# Patient Record
Sex: FEMALE | Race: WHITE | NOT HISPANIC OR LATINO | Employment: FULL TIME | URBAN - METROPOLITAN AREA
[De-identification: names, ages, dates, MRNs, and addresses within clinical notes are randomized per-mention and may not be internally consistent; named-entity substitution may affect disease eponyms.]

---

## 2017-10-17 ENCOUNTER — APPOINTMENT (OUTPATIENT)
Dept: WOMENS IMAGING | Facility: HOSPITAL | Age: 42
End: 2017-10-17

## 2017-10-17 PROCEDURE — 77067 SCR MAMMO BI INCL CAD: CPT | Performed by: RADIOLOGY

## 2018-02-05 ENCOUNTER — OFFICE VISIT (OUTPATIENT)
Dept: INTERNAL MEDICINE | Facility: CLINIC | Age: 43
End: 2018-02-05

## 2018-02-05 VITALS
OXYGEN SATURATION: 99 % | BODY MASS INDEX: 36.29 KG/M2 | WEIGHT: 197.19 LBS | HEART RATE: 103 BPM | SYSTOLIC BLOOD PRESSURE: 116 MMHG | DIASTOLIC BLOOD PRESSURE: 72 MMHG | HEIGHT: 62 IN

## 2018-02-05 DIAGNOSIS — F41.1 GENERALIZED ANXIETY DISORDER: Primary | ICD-10-CM

## 2018-02-05 PROCEDURE — 99214 OFFICE O/P EST MOD 30 MIN: CPT | Performed by: NURSE PRACTITIONER

## 2018-02-05 RX ORDER — HYDROXYZINE PAMOATE 25 MG/1
25 CAPSULE ORAL 2 TIMES DAILY PRN
Qty: 30 CAPSULE | Refills: 1 | Status: SHIPPED | OUTPATIENT
Start: 2018-02-05 | End: 2018-03-20

## 2018-02-05 RX ORDER — ESCITALOPRAM OXALATE 10 MG/1
10 TABLET ORAL DAILY
Qty: 30 TABLET | Refills: 1 | Status: SHIPPED | OUTPATIENT
Start: 2018-02-05 | End: 2018-03-20 | Stop reason: SDUPTHER

## 2018-02-05 NOTE — PROGRESS NOTES
Subjective   Leann Gan is a 42 y.o. female. Patient is here today for   Chief Complaint   Patient presents with   • Anxiety     Pt here to discuss anxiety x2 months. Pt does report panic attacks. Pt has not taken any anxiety/depression medication in the past.    .    History of Present Illness   New patient here to establish care.  Health history and questionnaire have been reviewed in its entirety.   C/o some issues with anxiety for the past 2 months. She has had the following symptoms - overthinking, worrying, stomach in knots, irritable, tense muscles, can't think straight. 10 years ago her daughter's father was deported and she was stressed out him coming back for her, but that isn't anything that has happened recently  She has had trouble falling sleeping for about 7-8 years. She takes Benadryl which makes her drowsy the next day.   Denies being depressed.     The following portions of the patient's history were reviewed and updated as appropriate: allergies, current medications, past family history, past medical history, past social history, past surgical history and problem list.    Review of Systems   Constitutional: Negative.    Respiratory: Negative.    Cardiovascular: Negative.    Psychiatric/Behavioral: Positive for sleep disturbance. The patient is nervous/anxious.        Objective   Vitals:    02/05/18 1241   BP: 116/72   Pulse: 103   SpO2: 99%     Physical Exam   Constitutional: Vital signs are normal. She appears well-developed and well-nourished.   Cardiovascular: Normal rate, regular rhythm and normal heart sounds.    Pulmonary/Chest: Effort normal and breath sounds normal.   Skin: Skin is warm, dry and intact.   Psychiatric: Her speech is normal and behavior is normal. Judgment and thought content normal. Her mood appears anxious.   Nursing note and vitals reviewed.      Assessment/Plan   Leann was seen today for anxiety.    Diagnoses and all orders for this visit:    Generalized anxiety  disorder  -     escitalopram (LEXAPRO) 10 MG tablet; Take 1 tablet by mouth Daily.  -     hydrOXYzine (VISTARIL) 25 MG capsule; Take 1 capsule by mouth 2 (Two) Times a Day As Needed for Anxiety.    Patient will take lexapro in the evening to help with anxiety and to see if that will help with her sleeping also. F/u in one month to reassess

## 2018-02-20 ENCOUNTER — TELEPHONE (OUTPATIENT)
Dept: INTERNAL MEDICINE | Facility: CLINIC | Age: 43
End: 2018-02-20

## 2018-02-20 NOTE — TELEPHONE ENCOUNTER
----- Message from LIZETT Siddiqi sent at 2/20/2018  8:40 AM EST -----  Regarding: RE: MED CONCERN  Contact: [phone]  I had also started her on lexapro. The hydroxyzine was just to be used as needed. Stop hydroxyzine and continue with lexapro.     ----- Message -----     From: Cat Altamirano MA     Sent: 2/20/2018   8:15 AM       To: LIZETT Siddiqi  Subject: FW: MED CONCERN                                  Pt stated that her Hydroxyzine is making her have headaches, feel fatigue & feel like she in a a fog. Pt is requesting to try something else.     ----- Message -----     From: Emily Ho     Sent: 2/19/2018   1:52 PM       To: Cat Altamirano MA  Subject: MED CONCERN                                      Patient called concerning the Hydroxizine. She said it isn't working for her and she is having some side effects from it. She has a very low amount of energy. She has been taking it for 3 weeks and would like to discuss it. Thanks

## 2018-02-20 NOTE — TELEPHONE ENCOUNTER
Pt aware to stop hydroxyzine completely & continue the lexapro. Pt will follow up with us on 3/6/2018.

## 2018-03-20 ENCOUNTER — OFFICE VISIT (OUTPATIENT)
Dept: INTERNAL MEDICINE | Facility: CLINIC | Age: 43
End: 2018-03-20

## 2018-03-20 VITALS
BODY MASS INDEX: 35.7 KG/M2 | OXYGEN SATURATION: 98 % | TEMPERATURE: 98.9 F | SYSTOLIC BLOOD PRESSURE: 112 MMHG | HEIGHT: 62 IN | DIASTOLIC BLOOD PRESSURE: 72 MMHG | WEIGHT: 194 LBS | HEART RATE: 82 BPM

## 2018-03-20 DIAGNOSIS — F41.1 GENERALIZED ANXIETY DISORDER: Primary | ICD-10-CM

## 2018-03-20 DIAGNOSIS — Z00.00 HEALTHCARE MAINTENANCE: ICD-10-CM

## 2018-03-20 PROCEDURE — 99213 OFFICE O/P EST LOW 20 MIN: CPT | Performed by: NURSE PRACTITIONER

## 2018-03-20 RX ORDER — ESCITALOPRAM OXALATE 10 MG/1
10 TABLET ORAL DAILY
Qty: 30 TABLET | Refills: 3 | Status: SHIPPED | OUTPATIENT
Start: 2018-03-20 | End: 2018-08-16 | Stop reason: SDUPTHER

## 2018-03-20 NOTE — PROGRESS NOTES
Subjective   Leann Gan is a 43 y.o. female. Patient is here today for   Chief Complaint   Patient presents with   • Anxiety     Pt here to follow up on anxiety. Pt does think that the lexapro 10mg once corina is helping but she still sees some anxiety attacks that come on.    .    History of Present Illness   Patient is here to follow up on anxiety. She is feeling better overall. She does have some panic episodes about 2-3 times per week.   She has had some increased thirst, but otherwise denies any side effects. She stopped taking the hydroxyzine, because it made her too drowsy. She hasn't seen a therapist or counselor.   She is sleeping okay.     The following portions of the patient's history were reviewed and updated as appropriate: allergies, current medications, past family history, past medical history, past social history, past surgical history and problem list.    Review of Systems   Constitutional: Negative.    Respiratory: Negative.    Cardiovascular: Negative.    Gastrointestinal: Negative.    Psychiatric/Behavioral: Negative for sleep disturbance. The patient is nervous/anxious.        Objective   Vitals:    03/20/18 1339   BP: 112/72   Pulse: 82   Temp: 98.9 °F (37.2 °C)   SpO2: 98%     Physical Exam   Constitutional: Vital signs are normal. She appears well-developed and well-nourished.   Cardiovascular: Normal rate, regular rhythm and normal heart sounds.    Pulmonary/Chest: Effort normal and breath sounds normal.   Skin: Skin is warm, dry and intact.   Psychiatric: She has a normal mood and affect. Her speech is normal and behavior is normal. Judgment and thought content normal.   Nursing note and vitals reviewed.      Assessment/Plan   Leann was seen today for anxiety.    Diagnoses and all orders for this visit:    Generalized anxiety disorder  -     escitalopram (LEXAPRO) 10 MG tablet; Take 1 tablet by mouth Daily.    Healthcare maintenance  -     CBC & Differential  -     Comprehensive  Metabolic Panel  -     Lipid Panel With / Chol / HDL Ratio  -     TSH  -     Vitamin D 25 Hydroxy    Patient has not had fasting labs, so will return for those in the next couple of weeks.     She was given a list of counselors. If she continues with increased anxiety, she will call the office and I will increase her lexapro to 20 mg daily.

## 2018-08-16 DIAGNOSIS — F41.1 GENERALIZED ANXIETY DISORDER: ICD-10-CM

## 2018-08-16 RX ORDER — ESCITALOPRAM OXALATE 10 MG/1
TABLET ORAL
Qty: 30 TABLET | Refills: 0 | Status: SHIPPED | OUTPATIENT
Start: 2018-08-16 | End: 2018-09-14 | Stop reason: SDUPTHER

## 2018-09-14 DIAGNOSIS — F41.1 GENERALIZED ANXIETY DISORDER: ICD-10-CM

## 2018-09-14 RX ORDER — ESCITALOPRAM OXALATE 10 MG/1
TABLET ORAL
Qty: 30 TABLET | Refills: 0 | Status: SHIPPED | OUTPATIENT
Start: 2018-09-14 | End: 2018-10-01 | Stop reason: SDUPTHER

## 2018-10-01 ENCOUNTER — OFFICE VISIT (OUTPATIENT)
Dept: INTERNAL MEDICINE | Facility: CLINIC | Age: 43
End: 2018-10-01

## 2018-10-01 VITALS
DIASTOLIC BLOOD PRESSURE: 68 MMHG | OXYGEN SATURATION: 99 % | SYSTOLIC BLOOD PRESSURE: 102 MMHG | HEIGHT: 62 IN | WEIGHT: 160.5 LBS | HEART RATE: 79 BPM | BODY MASS INDEX: 29.53 KG/M2

## 2018-10-01 DIAGNOSIS — F41.1 GENERALIZED ANXIETY DISORDER: Primary | ICD-10-CM

## 2018-10-01 PROCEDURE — 99213 OFFICE O/P EST LOW 20 MIN: CPT | Performed by: NURSE PRACTITIONER

## 2018-10-01 RX ORDER — ESCITALOPRAM OXALATE 10 MG/1
10 TABLET ORAL DAILY
Qty: 30 TABLET | Refills: 11 | Status: SHIPPED | OUTPATIENT
Start: 2018-10-01 | End: 2019-10-20 | Stop reason: SDUPTHER

## 2018-10-01 NOTE — PROGRESS NOTES
Subjective   Leann Gan is a 43 y.o. female. Patient is here today for   Chief Complaint   Patient presents with   • Anxiety     Pt here to follow up on anxiety. Pt doing well on Lexapro 10mg once daily.    .    History of Present Illness   Patient is here to follow up on anxiety. She is doing well on lexapro once daily.   Denies any problems. She has lost weight by doing the Keto diet and intermittent fasting.     The following portions of the patient's history were reviewed and updated as appropriate: allergies, current medications, past family history, past medical history, past social history, past surgical history and problem list.    Review of Systems   Constitutional: Negative.    Respiratory: Negative.    Cardiovascular: Negative.    Psychiatric/Behavioral: Negative.        Objective   Vitals:    10/01/18 1315   BP: 102/68   Pulse: 79   SpO2: 99%     Physical Exam   Constitutional: Vital signs are normal. She appears well-developed and well-nourished.   Cardiovascular: Normal rate, regular rhythm and normal heart sounds.    Pulmonary/Chest: Effort normal and breath sounds normal.   Skin: Skin is warm, dry and intact.   Psychiatric: She has a normal mood and affect. Her speech is normal and behavior is normal. Thought content normal.   Nursing note and vitals reviewed.    Assessment/Plan   Leann was seen today for anxiety.    Diagnoses and all orders for this visit:    Generalized anxiety disorder  -     escitalopram (LEXAPRO) 10 MG tablet; Take 1 tablet by mouth Daily.    Patient will return next month for fasting labs.

## 2018-11-02 ENCOUNTER — APPOINTMENT (OUTPATIENT)
Dept: WOMENS IMAGING | Facility: HOSPITAL | Age: 43
End: 2018-11-02

## 2018-11-02 PROCEDURE — 77063 BREAST TOMOSYNTHESIS BI: CPT | Performed by: RADIOLOGY

## 2018-11-02 PROCEDURE — 77067 SCR MAMMO BI INCL CAD: CPT | Performed by: RADIOLOGY

## 2019-10-20 DIAGNOSIS — F41.1 GENERALIZED ANXIETY DISORDER: ICD-10-CM

## 2019-10-21 RX ORDER — ESCITALOPRAM OXALATE 10 MG/1
TABLET ORAL
Qty: 30 TABLET | Refills: 10 | Status: SHIPPED | OUTPATIENT
Start: 2019-10-21 | End: 2020-02-27 | Stop reason: SDUPTHER

## 2020-01-07 ENCOUNTER — APPOINTMENT (OUTPATIENT)
Dept: WOMENS IMAGING | Facility: HOSPITAL | Age: 45
End: 2020-01-07

## 2020-01-07 PROCEDURE — 77063 BREAST TOMOSYNTHESIS BI: CPT | Performed by: RADIOLOGY

## 2020-01-07 PROCEDURE — 77067 SCR MAMMO BI INCL CAD: CPT | Performed by: RADIOLOGY

## 2020-02-21 DIAGNOSIS — Z00.00 HEALTHCARE MAINTENANCE: Primary | ICD-10-CM

## 2020-02-25 LAB
25(OH)D3+25(OH)D2 SERPL-MCNC: 34 NG/ML (ref 30–100)
ALBUMIN SERPL-MCNC: 4.1 G/DL (ref 3.5–5.2)
ALBUMIN/GLOB SERPL: 1.6 G/DL
ALP SERPL-CCNC: 59 U/L (ref 39–117)
ALT SERPL-CCNC: 18 U/L (ref 1–33)
AST SERPL-CCNC: 23 U/L (ref 1–32)
BASOPHILS # BLD AUTO: 0.03 10*3/MM3 (ref 0–0.2)
BASOPHILS NFR BLD AUTO: 0.6 % (ref 0–1.5)
BILIRUB SERPL-MCNC: 0.3 MG/DL (ref 0.2–1.2)
BUN SERPL-MCNC: 11 MG/DL (ref 6–20)
BUN/CREAT SERPL: 14.7 (ref 7–25)
CALCIUM SERPL-MCNC: 8.6 MG/DL (ref 8.6–10.5)
CHLORIDE SERPL-SCNC: 103 MMOL/L (ref 98–107)
CHOLEST SERPL-MCNC: 177 MG/DL (ref 0–200)
CHOLEST/HDLC SERPL: 3.22 {RATIO}
CO2 SERPL-SCNC: 22.6 MMOL/L (ref 22–29)
CREAT SERPL-MCNC: 0.75 MG/DL (ref 0.57–1)
EOSINOPHIL # BLD AUTO: 0.17 10*3/MM3 (ref 0–0.4)
EOSINOPHIL NFR BLD AUTO: 3.3 % (ref 0.3–6.2)
ERYTHROCYTE [DISTWIDTH] IN BLOOD BY AUTOMATED COUNT: 16.4 % (ref 12.3–15.4)
FERRITIN SERPL-MCNC: 7.19 NG/ML (ref 13–150)
GLOBULIN SER CALC-MCNC: 2.6 GM/DL
GLUCOSE SERPL-MCNC: 83 MG/DL (ref 65–99)
HCT VFR BLD AUTO: 33.1 % (ref 34–46.6)
HDLC SERPL-MCNC: 55 MG/DL (ref 40–60)
HGB BLD-MCNC: 10.4 G/DL (ref 12–15.9)
IMM GRANULOCYTES # BLD AUTO: 0 10*3/MM3 (ref 0–0.05)
IMM GRANULOCYTES NFR BLD AUTO: 0 % (ref 0–0.5)
IRON SATN MFR SERPL: 8 % (ref 20–50)
IRON SERPL-MCNC: 37 MCG/DL (ref 37–145)
LDLC SERPL CALC-MCNC: 113 MG/DL (ref 0–100)
LYMPHOCYTES # BLD AUTO: 1.47 10*3/MM3 (ref 0.7–3.1)
LYMPHOCYTES NFR BLD AUTO: 28.7 % (ref 19.6–45.3)
MCH RBC QN AUTO: 25.2 PG (ref 26.6–33)
MCHC RBC AUTO-ENTMCNC: 31.4 G/DL (ref 31.5–35.7)
MCV RBC AUTO: 80.3 FL (ref 79–97)
MONOCYTES # BLD AUTO: 0.42 10*3/MM3 (ref 0.1–0.9)
MONOCYTES NFR BLD AUTO: 8.2 % (ref 5–12)
NEUTROPHILS # BLD AUTO: 3.03 10*3/MM3 (ref 1.7–7)
NEUTROPHILS NFR BLD AUTO: 59.2 % (ref 42.7–76)
NRBC BLD AUTO-RTO: 0 /100 WBC (ref 0–0.2)
PLATELET # BLD AUTO: 345 10*3/MM3 (ref 140–450)
POTASSIUM SERPL-SCNC: 4.1 MMOL/L (ref 3.5–5.2)
PROT SERPL-MCNC: 6.7 G/DL (ref 6–8.5)
RBC # BLD AUTO: 4.12 10*6/MM3 (ref 3.77–5.28)
SODIUM SERPL-SCNC: 139 MMOL/L (ref 136–145)
TIBC SERPL-MCNC: 466 MCG/DL
TRIGL SERPL-MCNC: 45 MG/DL (ref 0–150)
TSH SERPL DL<=0.005 MIU/L-ACNC: 1.4 UIU/ML (ref 0.27–4.2)
UIBC SERPL-MCNC: 429 MCG/DL (ref 112–346)
VLDLC SERPL CALC-MCNC: 9 MG/DL
WBC # BLD AUTO: 5.12 10*3/MM3 (ref 3.4–10.8)
WRITTEN AUTHORIZATION: NORMAL

## 2020-02-26 ENCOUNTER — PREP FOR SURGERY (OUTPATIENT)
Dept: OTHER | Facility: HOSPITAL | Age: 45
End: 2020-02-26

## 2020-02-26 DIAGNOSIS — K63.5 COLON POLYPS: Primary | ICD-10-CM

## 2020-02-27 ENCOUNTER — OFFICE VISIT (OUTPATIENT)
Dept: INTERNAL MEDICINE | Facility: CLINIC | Age: 45
End: 2020-02-27

## 2020-02-27 VITALS
SYSTOLIC BLOOD PRESSURE: 108 MMHG | WEIGHT: 193 LBS | HEIGHT: 62 IN | BODY MASS INDEX: 35.51 KG/M2 | OXYGEN SATURATION: 99 % | HEART RATE: 104 BPM | DIASTOLIC BLOOD PRESSURE: 70 MMHG

## 2020-02-27 DIAGNOSIS — Z00.00 HEALTHCARE MAINTENANCE: Primary | ICD-10-CM

## 2020-02-27 DIAGNOSIS — D50.9 IRON DEFICIENCY ANEMIA, UNSPECIFIED IRON DEFICIENCY ANEMIA TYPE: ICD-10-CM

## 2020-02-27 DIAGNOSIS — Z23 NEED FOR TDAP VACCINATION: ICD-10-CM

## 2020-02-27 DIAGNOSIS — F41.1 GENERALIZED ANXIETY DISORDER: ICD-10-CM

## 2020-02-27 PROCEDURE — 99396 PREV VISIT EST AGE 40-64: CPT | Performed by: NURSE PRACTITIONER

## 2020-02-27 PROCEDURE — 90471 IMMUNIZATION ADMIN: CPT | Performed by: NURSE PRACTITIONER

## 2020-02-27 PROCEDURE — 99213 OFFICE O/P EST LOW 20 MIN: CPT | Performed by: NURSE PRACTITIONER

## 2020-02-27 PROCEDURE — 90715 TDAP VACCINE 7 YRS/> IM: CPT | Performed by: NURSE PRACTITIONER

## 2020-02-27 RX ORDER — ESCITALOPRAM OXALATE 10 MG/1
10 TABLET ORAL DAILY
Qty: 90 TABLET | Refills: 3 | Status: SHIPPED | OUTPATIENT
Start: 2020-02-27 | End: 2021-09-14 | Stop reason: SDUPTHER

## 2020-02-27 NOTE — PROGRESS NOTES
"Subjective   Leann Gan is a 44 y.o. female and is here for a comprehensive physical exam. The patient reports no problems.    Patient is here to follow up on anxiety.  She is doing well on Lexapro 10 mg daily.  States that she has been under some increased stress due to going back to work full-time after not working for several years, but feels like her anxiety is under control.    Do you take any herbs or supplements that were not prescribed by a doctor? no     History:  Patient sees gynecology    The following portions of the patient's history were reviewed and updated as appropriate: allergies, current medications, past family history, past medical history, past social history, past surgical history and problem list.    Review of Systems  Do you have pain that bothers you in your daily life? no  A comprehensive review of systems was negative.    Objective   /70 (BP Location: Left arm, Patient Position: Sitting, Cuff Size: Adult)   Pulse 104   Ht 157.5 cm (62\")   Wt 87.5 kg (193 lb)   SpO2 99%   BMI 35.30 kg/m²     General Appearance:    Alert, cooperative, no distress, appears stated age   Head:    Normocephalic, without obvious abnormality, atraumatic   Eyes:    PERRL, conjunctiva/corneas clear, EOM's intact, both eyes   Ears:    Normal TM's and external ear canals, both ears   Nose:   Nares normal, septum midline, mucosa normal, no drainage    or sinus tenderness   Throat:   Lips, mucosa, and tongue normal; teeth and gums normal   Neck:   Supple, symmetrical, trachea midline, no adenopathy;     thyroid:  no enlargement/tenderness/nodules; no carotid    bruit   Back:     Symmetric, no curvature, ROM normal, no CVA tenderness   Lungs:     Clear to auscultation bilaterally, respirations unlabored   Chest Wall:    No tenderness or deformity    Heart:    Regular rate and rhythm, S1 and S2 normal, no murmur       Abdomen:     Soft, non-tender, bowel sounds active all four quadrants,     no masses, " no organomegaly           Extremities:   Extremities normal, atraumatic, no cyanosis or edema   Pulses:   2+ and symmetric all extremities   Skin:   Skin color, texture, turgor normal, no rashes or lesions   Lymph nodes:   Cervical, supraclavicular, and axillary nodes normal   Neurologic:   Grossly intact, normal strength, sensation and reflexes     throughout     Orders Only on 02/21/2020   Component Date Value Ref Range Status   • WBC 02/24/2020 5.12  3.40 - 10.80 10*3/mm3 Final   • RBC 02/24/2020 4.12  3.77 - 5.28 10*6/mm3 Final   • Hemoglobin 02/24/2020 10.4* 12.0 - 15.9 g/dL Final   • Hematocrit 02/24/2020 33.1* 34.0 - 46.6 % Final   • MCV 02/24/2020 80.3  79.0 - 97.0 fL Final   • MCH 02/24/2020 25.2* 26.6 - 33.0 pg Final   • MCHC 02/24/2020 31.4* 31.5 - 35.7 g/dL Final   • RDW 02/24/2020 16.4* 12.3 - 15.4 % Final   • Platelets 02/24/2020 345  140 - 450 10*3/mm3 Final   • Neutrophil Rel % 02/24/2020 59.2  42.7 - 76.0 % Final   • Lymphocyte Rel % 02/24/2020 28.7  19.6 - 45.3 % Final   • Monocyte Rel % 02/24/2020 8.2  5.0 - 12.0 % Final   • Eosinophil Rel % 02/24/2020 3.3  0.3 - 6.2 % Final   • Basophil Rel % 02/24/2020 0.6  0.0 - 1.5 % Final   • Neutrophils Absolute 02/24/2020 3.03  1.70 - 7.00 10*3/mm3 Final   • Lymphocytes Absolute 02/24/2020 1.47  0.70 - 3.10 10*3/mm3 Final   • Monocytes Absolute 02/24/2020 0.42  0.10 - 0.90 10*3/mm3 Final   • Eosinophils Absolute 02/24/2020 0.17  0.00 - 0.40 10*3/mm3 Final   • Basophils Absolute 02/24/2020 0.03  0.00 - 0.20 10*3/mm3 Final   • Immature Granulocyte Rel % 02/24/2020 0.0  0.0 - 0.5 % Final   • Immature Grans Absolute 02/24/2020 0.00  0.00 - 0.05 10*3/mm3 Final   • nRBC 02/24/2020 0.0  0.0 - 0.2 /100 WBC Final   • Glucose 02/24/2020 83  65 - 99 mg/dL Final   • BUN 02/24/2020 11  6 - 20 mg/dL Final   • Creatinine 02/24/2020 0.75  0.57 - 1.00 mg/dL Final   • eGFR Non  Am 02/24/2020 84  >60 mL/min/1.73 Final   • eGFR African Am 02/24/2020 102  >60 mL/min/1.73  Final   • BUN/Creatinine Ratio 02/24/2020 14.7  7.0 - 25.0 Final   • Sodium 02/24/2020 139  136 - 145 mmol/L Final   • Potassium 02/24/2020 4.1  3.5 - 5.2 mmol/L Final   • Chloride 02/24/2020 103  98 - 107 mmol/L Final   • Total CO2 02/24/2020 22.6  22.0 - 29.0 mmol/L Final   • Calcium 02/24/2020 8.6  8.6 - 10.5 mg/dL Final   • Total Protein 02/24/2020 6.7  6.0 - 8.5 g/dL Final   • Albumin 02/24/2020 4.10  3.50 - 5.20 g/dL Final   • Globulin 02/24/2020 2.6  gm/dL Final   • A/G Ratio 02/24/2020 1.6  g/dL Final   • Total Bilirubin 02/24/2020 0.3  0.2 - 1.2 mg/dL Final   • Alkaline Phosphatase 02/24/2020 59  39 - 117 U/L Final   • AST (SGOT) 02/24/2020 23  1 - 32 U/L Final   • ALT (SGPT) 02/24/2020 18  1 - 33 U/L Final   • Total Cholesterol 02/24/2020 177  0 - 200 mg/dL Final   • Triglycerides 02/24/2020 45  0 - 150 mg/dL Final   • HDL Cholesterol 02/24/2020 55  40 - 60 mg/dL Final   • VLDL Cholesterol 02/24/2020 9  mg/dL Final   • LDL Cholesterol  02/24/2020 113* 0 - 100 mg/dL Final   • Chol/HDL Ratio 02/24/2020 3.22   Final   • TSH 02/24/2020 1.400  0.270 - 4.200 uIU/mL Final   • 25 Hydroxy, Vitamin D 02/24/2020 34.0  30.0 - 100.0 ng/ml Final    Comment: Results may be falsely increased if patient taking Biotin.  Reference Range for Total Vitamin D 25(OH)  Deficiency <20.0 ng/mL  Insufficiency 21-29 ng/mL  Sufficiency  ng/mL  Toxicity >100 ng/ml     • TIBC 02/24/2020 466  mcg/dL Final   • UIBC 02/24/2020 429* 112 - 346 mcg/dL Final   • Iron 02/24/2020 37  37 - 145 mcg/dL Final   • Iron Saturation 02/24/2020 8* 20 - 50 % Final   • Ferritin 02/24/2020 7.19* 13.00 - 150.00 ng/mL Final    Results may be falsely decreased if patient taking Biotin.   • Written Authorization 02/24/2020 Comment   Final    Comment: Written Authorization Received.  Authorization received from Written Request 02-  Logged by Flaquita Gomez       Reviewed labs with patient.        Assessment/Plan   Healthy female exam.      1. Leann  was seen today for annual exam and anxiety.    Diagnoses and all orders for this visit:    Healthcare maintenance  -     Tdap Vaccine Greater Than or Equal To 6yo IM    Iron deficiency anemia, unspecified iron deficiency anemia type  -     Ferrous Sulfate Dried ER (SLOW IRON) 160 (50 Fe) MG tablet controlled-release; Take 1 tablet by mouth Daily.  -     CBC No Differential; Future  -     Ferritin; Future    Generalized anxiety disorder  -     escitalopram (LEXAPRO) 10 MG tablet; Take 1 tablet by mouth Daily.    Need for Tdap vaccination  -     Tdap Vaccine Greater Than or Equal To 6yo IM        2. Patient Counseling:  --Nutrition: Stressed importance of moderation in sodium/caffeine intake, saturated fat and cholesterol, caloric balance, sufficient intake of fresh fruits, vegetables, fiber, calcium, iron.  Patient states that she has not been eating as well since she has gone back to work.  Discussed options for lunch she is at work.  --Exercise: Stressed the importance of regular exercise.   --Injury prevention: Discussed safety belts, safety helmets, smoke detector.   --Dental health: Discussed importance of regular tooth brushing, flossing, and dental visits.  --Immunizations reviewed.    3. Discussed the patient's BMI with her.  The BMI is above average; BMI management plan is completed  4. Follow up in one month for labs

## 2020-03-27 ENCOUNTER — RESULTS ENCOUNTER (OUTPATIENT)
Dept: INTERNAL MEDICINE | Facility: CLINIC | Age: 45
End: 2020-03-27

## 2020-03-27 DIAGNOSIS — D50.9 IRON DEFICIENCY ANEMIA, UNSPECIFIED IRON DEFICIENCY ANEMIA TYPE: ICD-10-CM

## 2021-08-16 DIAGNOSIS — F41.1 GENERALIZED ANXIETY DISORDER: ICD-10-CM

## 2021-08-17 RX ORDER — ESCITALOPRAM OXALATE 10 MG/1
TABLET ORAL
Qty: 90 TABLET | Refills: 3 | OUTPATIENT
Start: 2021-08-17

## 2021-08-19 DIAGNOSIS — F41.1 GENERALIZED ANXIETY DISORDER: ICD-10-CM

## 2021-08-19 RX ORDER — ESCITALOPRAM OXALATE 10 MG/1
TABLET ORAL
Qty: 90 TABLET | Refills: 3 | OUTPATIENT
Start: 2021-08-19

## 2021-08-25 DIAGNOSIS — F41.1 GENERALIZED ANXIETY DISORDER: ICD-10-CM

## 2021-08-25 RX ORDER — ESCITALOPRAM OXALATE 10 MG/1
10 TABLET ORAL DAILY
Qty: 90 TABLET | Refills: 3 | OUTPATIENT
Start: 2021-08-25

## 2021-08-25 NOTE — TELEPHONE ENCOUNTER
Caller: Elie Lindsay    Relationship: Emergency Contact    Best call back number:[phone] ([phone])    Medication needed:   Requested Prescriptions     Pending Prescriptions Disp Refills   • escitalopram (LEXAPRO) 10 MG tablet 90 tablet 3     Sig: Take 1 tablet by mouth Daily.       When do you need the refill by: 08/25/21    What additional details did the patient provide when requesting the medication:     Does the patient have less than a 3 day supply:  [] Yes  [x] No    What is the patient's preferred pharmacy: [facility] [address] [facility] [phone] [facility] [phone] FX

## 2021-09-14 ENCOUNTER — OFFICE VISIT (OUTPATIENT)
Dept: INTERNAL MEDICINE | Facility: CLINIC | Age: 46
End: 2021-09-14

## 2021-09-14 VITALS
TEMPERATURE: 98.2 F | WEIGHT: 196 LBS | HEART RATE: 78 BPM | DIASTOLIC BLOOD PRESSURE: 62 MMHG | OXYGEN SATURATION: 100 % | HEIGHT: 62 IN | BODY MASS INDEX: 36.07 KG/M2 | SYSTOLIC BLOOD PRESSURE: 112 MMHG

## 2021-09-14 DIAGNOSIS — F41.1 GENERALIZED ANXIETY DISORDER: ICD-10-CM

## 2021-09-14 PROCEDURE — 99213 OFFICE O/P EST LOW 20 MIN: CPT | Performed by: NURSE PRACTITIONER

## 2021-09-14 RX ORDER — ESCITALOPRAM OXALATE 10 MG/1
10 TABLET ORAL DAILY
Qty: 90 TABLET | Refills: 3 | Status: SHIPPED | OUTPATIENT
Start: 2021-09-14 | End: 2022-11-11

## 2021-09-14 RX ORDER — DIPHENOXYLATE HYDROCHLORIDE AND ATROPINE SULFATE 2.5; .025 MG/1; MG/1
TABLET ORAL DAILY
COMMUNITY

## 2021-09-14 NOTE — PROGRESS NOTES
Subjective   Leann Gan is a 46 y.o. female. Patient is here today for   Chief Complaint   Patient presents with   • Anxiety   • Med Refill   .    History of Present Illness   Patient is here to follow up on anxiety.  She is currently taking Lexapro 5 mg daily. She tried to wean off twice, but would like to stay on it. She is wanting to take 10 mg daily as she was previously on this dose    The following portions of the patient's history were reviewed and updated as appropriate: allergies, current medications, past family history, past medical history, past social history, past surgical history and problem list.    Review of Systems    Objective   Vitals:    09/14/21 1439   BP: 112/62   Pulse: 78   Temp: 98.2 °F (36.8 °C)   SpO2: 100%     Body mass index is 35.85 kg/m².  Physical Exam  Vitals and nursing note reviewed.   Constitutional:       Appearance: Normal appearance. She is well-developed.   Cardiovascular:      Rate and Rhythm: Normal rate and regular rhythm.      Heart sounds: Normal heart sounds.   Pulmonary:      Effort: Pulmonary effort is normal.      Breath sounds: Normal breath sounds.   Skin:     General: Skin is warm and dry.   Neurological:      Mental Status: She is alert and oriented to person, place, and time.   Psychiatric:         Speech: Speech normal.         Behavior: Behavior normal.         Thought Content: Thought content normal.         Assessment/Plan   Diagnoses and all orders for this visit:    1. Generalized anxiety disorder  -     escitalopram (LEXAPRO) 10 MG tablet; Take 1 tablet by mouth Daily.  Dispense: 90 tablet; Refill: 3      Patient will make an appointment for a physical in the next couple of months with fasting labs prior

## 2021-11-12 DIAGNOSIS — Z00.00 HEALTHCARE MAINTENANCE: Primary | ICD-10-CM

## 2021-11-12 DIAGNOSIS — D50.9 IRON DEFICIENCY ANEMIA, UNSPECIFIED IRON DEFICIENCY ANEMIA TYPE: ICD-10-CM

## 2022-11-10 DIAGNOSIS — F41.1 GENERALIZED ANXIETY DISORDER: ICD-10-CM

## 2022-11-11 RX ORDER — ESCITALOPRAM OXALATE 10 MG/1
TABLET ORAL
Qty: 30 TABLET | Refills: 0 | Status: SHIPPED | OUTPATIENT
Start: 2022-11-11 | End: 2023-04-04 | Stop reason: SDUPTHER

## 2022-12-07 DIAGNOSIS — F41.1 GENERALIZED ANXIETY DISORDER: ICD-10-CM

## 2022-12-08 RX ORDER — ESCITALOPRAM OXALATE 10 MG/1
TABLET ORAL
Qty: 30 TABLET | Refills: 0 | OUTPATIENT
Start: 2022-12-08

## 2022-12-11 DIAGNOSIS — F41.1 GENERALIZED ANXIETY DISORDER: ICD-10-CM

## 2022-12-12 RX ORDER — ESCITALOPRAM OXALATE 10 MG/1
TABLET ORAL
Qty: 30 TABLET | Refills: 0 | OUTPATIENT
Start: 2022-12-12

## 2023-04-04 ENCOUNTER — TELEPHONE (OUTPATIENT)
Dept: INTERNAL MEDICINE | Facility: CLINIC | Age: 48
End: 2023-04-04

## 2023-04-04 DIAGNOSIS — F41.1 GENERALIZED ANXIETY DISORDER: ICD-10-CM

## 2023-04-04 RX ORDER — ESCITALOPRAM OXALATE 10 MG/1
10 TABLET ORAL DAILY
Qty: 30 TABLET | Refills: 1 | Status: SHIPPED | OUTPATIENT
Start: 2023-04-04

## 2023-04-04 NOTE — TELEPHONE ENCOUNTER
Caller: Leann Gan    Relationship: Self    Best call back number: [phone]     What orders are you requesting (i.e. lab or imaging): LABS    In what timeframe would the patient need to come in: PRIOR TO PHYSICAL ON 06/01/23    Where will you receive your lab/imaging services: IN OFFICE    Additional notes: PATIENT WOULD LIKE TO BE CONTACTED TO SCHEDULE LABS

## 2023-04-04 NOTE — TELEPHONE ENCOUNTER
Caller: Elvia Leann KARISHMA    Relationship: Self    Best call back number: [phone]     Requested Prescriptions:   Requested Prescriptions     Pending Prescriptions Disp Refills   • escitalopram (LEXAPRO) 10 MG tablet 30 tablet 0     Sig: Take 1 tablet by mouth Daily.        Pharmacy where request should be sent: [facility] [id number] [facility] [address] [facility] [phone] [facility] [phone] FX     Last office visit with prescribing clinician: 9/14/2021   Last telemedicine visit with prescribing clinician: 6/1/2023   Next office visit with prescribing clinician: 6/1/2023    Does the patient have less than a 3 day supply:  [x] Yes  [] No    Would you like a call back once the refill request has been completed: [] Yes [x] No    If the office needs to give you a call back, can they leave a voicemail: [] Yes [x] No    April Schneider Rep   04/04/23 10:56 EDT

## 2023-06-01 ENCOUNTER — OFFICE VISIT (OUTPATIENT)
Dept: INTERNAL MEDICINE | Facility: CLINIC | Age: 48
End: 2023-06-01

## 2023-06-01 VITALS
OXYGEN SATURATION: 98 % | WEIGHT: 229 LBS | HEIGHT: 62 IN | BODY MASS INDEX: 42.14 KG/M2 | DIASTOLIC BLOOD PRESSURE: 82 MMHG | TEMPERATURE: 97.6 F | HEART RATE: 89 BPM | SYSTOLIC BLOOD PRESSURE: 120 MMHG

## 2023-06-01 DIAGNOSIS — F41.1 GENERALIZED ANXIETY DISORDER: ICD-10-CM

## 2023-06-01 DIAGNOSIS — D50.9 IRON DEFICIENCY ANEMIA, UNSPECIFIED IRON DEFICIENCY ANEMIA TYPE: ICD-10-CM

## 2023-06-01 DIAGNOSIS — Z00.00 HEALTHCARE MAINTENANCE: Primary | ICD-10-CM

## 2023-06-01 RX ORDER — ESCITALOPRAM OXALATE 10 MG/1
10 TABLET ORAL DAILY
Qty: 90 TABLET | Refills: 3 | Status: SHIPPED | OUTPATIENT
Start: 2023-06-01

## 2023-06-01 NOTE — PROGRESS NOTES
"Subjective   Leann Gan is a 48 y.o. female and is here for a comprehensive physical exam.     Do you take any herbs or supplements that were not prescribed by a doctor? iron supplement, multivitamin    Patient is here to follow up on iron deficiency anemia. She has been taking an iron supplement for about 6 months.     Patient is here to follow up on anxiety. She is doing well with lexapro.      History:  Sees [facility]  Dr. Winnie Kingsley    The following portions of the patient's history were reviewed and updated as appropriate: allergies, current medications, past family history, past medical history, past social history, past surgical history and problem list.    Review of Systems  Do you have pain that bothers you in your daily life? no  Pertinent items are noted in HPI.    Objective   /82   Pulse 89   Temp 97.6 °F (36.4 °C)   Ht 157.5 cm (62.01\")   Wt 104 kg (229 lb)   SpO2 98%   BMI 41.87 kg/m²     General Appearance:    Alert, cooperative, no distress, appears stated age   Head:    Normocephalic, without obvious abnormality, atraumatic   Eyes:    PERRL, conjunctiva/corneas clear, EOM's intact, both eyes   Ears:    Normal TM's and external ear canals, both ears   Nose:   Nares normal, septum midline, mucosa normal, no drainage    or sinus tenderness   Throat:   Lips, mucosa, and tongue normal; teeth and gums normal   Neck:   Supple, symmetrical, trachea midline, no adenopathy;     thyroid:  no enlargement/tenderness/nodules; no carotid    bruit   Back:     Symmetric, no curvature, ROM normal, no CVA tenderness   Lungs:     Clear to auscultation bilaterally, respirations unlabored   Chest Wall:    No tenderness or deformity    Heart:    Regular rate and rhythm, S1 and S2 normal, no murmur   Breast Exam:    No tenderness, masses, or nipple abnormality   Abdomen:     Soft, non-tender, bowel sounds active all four quadrants,     no masses, no organomegaly   Genitalia:    Normal female " without lesion, discharge or tenderness   Rectal:    Normal tone, no masses or tenderness; guaiac negative stool   Extremities:   Extremities normal, atraumatic, no cyanosis or edema   Pulses:   2+ and symmetric all extremities   Skin:   Skin color, texture, turgor normal, no rashes or lesions   Lymph nodes:   Cervical, supraclavicular, and axillary nodes normal   Neurologic:   Grossly intact, normal strength, sensation and reflexes     throughout        Assessment & Plan   Healthy female exam.      1. Diagnoses and all orders for this visit:    1. Healthcare maintenance (Primary)  -     CBC & Differential; Future  -     Comprehensive Metabolic Panel; Future  -     Lipid Panel With / Chol / HDL Ratio; Future  -     Vitamin D,25-Hydroxy; Future  -     TSH; Future  -     Hepatitis C Antibody; Future    2. Iron deficiency anemia, unspecified iron deficiency anemia type  -     CBC & Differential; Future  -     Ferritin; Future  -     Iron and TIBC; Future    3. Generalized anxiety disorder  -     escitalopram (LEXAPRO) 10 MG tablet; Take 1 tablet by mouth Daily.  Dispense: 90 tablet; Refill: 3      Anxiety - patient will continue with lexapro    Iron deficiency anemia - patient will return for labs    Patient will return for fasting labs. If normal, f/u in one year for a physical    Colon cancer screening - colonoscopy in 2020. Repeat in 5 years due to polyp  2. Patient Counseling:  --Nutrition: Stressed importance of moderation in sodium/caffeine intake, saturated fat and cholesterol, caloric balance, sufficient intake of fresh fruits, vegetables, fiber, calcium, iron.  --Exercise: Stressed the importance of regular exercise. walks  --Dental health: Discussed importance of regular tooth brushing, flossing, and dental visits.  --Immunizations reviewed.    3. Discussed the patient's BMI with her.  The BMI is above average; BMI management plan is completed  Class 3 Severe Obesity (BMI >=40). Obesity-related health  conditions include the following: none. Obesity is unchanged. BMI is is above average; BMI management plan is completed. We discussed portion control and increasing exercise.     4. Follow up next physical in 1 year

## 2023-06-19 DIAGNOSIS — D50.9 IRON DEFICIENCY ANEMIA, UNSPECIFIED IRON DEFICIENCY ANEMIA TYPE: Primary | ICD-10-CM

## 2023-08-03 ENCOUNTER — APPOINTMENT (OUTPATIENT)
Dept: WOMENS IMAGING | Facility: HOSPITAL | Age: 48
End: 2023-08-03
Payer: COMMERCIAL

## 2023-08-03 PROCEDURE — 76642 ULTRASOUND BREAST LIMITED: CPT | Performed by: RADIOLOGY

## 2023-08-03 PROCEDURE — G0279 TOMOSYNTHESIS, MAMMO: HCPCS | Performed by: RADIOLOGY

## 2023-08-03 PROCEDURE — 77065 DX MAMMO INCL CAD UNI: CPT | Performed by: RADIOLOGY

## 2023-08-03 PROCEDURE — 77061 BREAST TOMOSYNTHESIS UNI: CPT | Performed by: RADIOLOGY

## 2023-08-14 ENCOUNTER — TELEPHONE (OUTPATIENT)
Dept: SURGERY | Facility: CLINIC | Age: 48
End: 2023-08-14
Payer: COMMERCIAL

## 2023-08-14 NOTE — TELEPHONE ENCOUNTER
New patient chart prepared for Dunia or Alvin rt axilla thickening from Dr. Kingsley.  Dr. Katherine Agosto will review chart.

## 2023-08-16 ENCOUNTER — TELEPHONE (OUTPATIENT)
Dept: SURGERY | Facility: CLINIC | Age: 48
End: 2023-08-16
Payer: COMMERCIAL

## 2023-08-16 NOTE — TELEPHONE ENCOUNTER
Scheduled new patient apt with [facility] Tues 8/29/23 8:40 am, patient arrival 8:15 am. Patient voice mailbox is full, unable to leave a message.

## 2023-09-07 NOTE — PROGRESS NOTES
[facility]     Referring Provider: Self Referring     Chief complaint:  right axilla thickening     HPI: Ms. Leann Gan is a 49 yo woman, seen at the request of Self Referring, for right axilla thickening     I personally reviewed her records and summarized her relevant breast history/imagin2020 bilateral screening mammogram at [facility]  The breasts are almost entirely fatty.  No suspicious masses significant calcifications or other abnormalities are seen.  Impression  There is no mammographic evidence of malignancy  BI-RADS 1    2023 bilateral screening mammogram at [facility]  The breasts are almost entirely fatty.  No suspicious masses significant calcifications or other abnormalities are seen.  There is been no significant change from prior exam.  Breast  There is no mammographic evidence of malignancy.  BI-RADS 1    8/3/2023 right diagnostic mammogram right Limited breast ultrasound at [facility]  The breast is almost entirely fatty.  There are no new suspicious masses calcifications or areas of architectural distortion.  The palpable area of concern is not included mammographically.  There is no worrisome sonographic correlate for the pinpointed palpable area of concern within the right axilla.  A sonographically normal lymph node is seen within the axilla.  There are no shadowing or distortion seen.  Impression  There is no mammographic evidence of malignancy in the right breast at this time and there is no worrisome sonographic correlate to explain the right axillary palpable area.  Any further management the symptoms should be based on findings and clinical assessment.  That is negative imaging should not preclude further evaluation and or biopsy of suspicious clinical findings.  Return to screening in 1 year is recommended.  BI-RADS 2      She denies any family history of breast or ovarian cancer.     Today she presents with concerns regarding a fullness under the right arm  noticed at [facility] last year. She believes that it has gotten smaller since first seeing it.  She had a right dx mammo and limited right breast us that returned as BIRADS2. She had gained 30 pounds right before finding the area of concern.   She denies any breast pain, skin changes, or nipple discharge.  She denies any prior history of abnormal mammograms or breast biopsies.       Review of Systems - Oncology    Medications:    Current Outpatient Medications:     escitalopram (LEXAPRO) 10 MG tablet, Take 1 tablet by mouth Daily., Disp: 90 tablet, Rfl: 3    Ferrous Sulfate Dried ER (SLOW IRON) 160 (50 Fe) MG tablet controlled-release, Take 1 tablet by mouth Daily., Disp: 30 each, Rfl: 3    multivitamin (THERAGRAN) tablet tablet, Take  by mouth Daily., Disp: , Rfl:     vitamin D3 125 MCG (5000 UT) capsule capsule, Take 1 capsule by mouth Daily., Disp: , Rfl:     Allergies:  Allergies   Allergen Reactions    Azithromycin Mental Status Change       Medical history:  Past Medical History:   Diagnosis Date    Anxiety        Surgical History:  No past surgical history on file.    Family History:  Family History   Problem Relation Age of Onset    Bipolar disorder Mother     Liver disease Father     Heart failure Maternal Grandmother     Hypertension Maternal Grandmother     Stroke Paternal Grandfather        Social History:   Social History     Socioeconomic History    Marital status:    Tobacco Use    Smoking status: Never    Smokeless tobacco: Never   Vaping Use    Vaping Use: Never used   Substance and Sexual Activity    Alcohol use: No    Drug use: No    Sexual activity: Defer     Patient drinks 7 servings of caffeine per day.       GYNECOLOGIC HISTORY:   . P: 1. AB: 0.  Last menstrual period: 2023  Age at menarche: 12  Age at first childbirth: 28  Lactation/How long: N/A  Age at menopause: N/A  Total years of oral contraceptive use: 6 months   Total years of hormone replacement therapy:  N/A      Physical Exam  There were no vitals filed for this visit.  ECOG 0 - Asymptomatic  General: NAD, well appearing  Psych: a&o x 3, normal mood and affect  Eyes: EOMI, no scleral icterus  ENMT: neck supple without masses or thyromegaly, mucus membranes moist  Resp: normal effort, CTAB  CV: RRR, no murmurs, no edema   GI: soft, NT, ND  MSK: normal gait, normal ROM in bilateral shoulders  Lymph nodes: no cervical, supraclavicular or axillary lymphadenopathy  Breast: symmetric, large, pendulous  Right: No visible abnormalities on inspection while seated, with arms raised or hands on hips. No masses, skin changes, or nipple abnormalities. Accessory breast tissue  Left: No visible abnormalities on inspection while seated, with arms raised or hands on hips. No masses, skin changes, or nipple abnormalities.  Accessory breast tissue      Assessment:    Accessory breast tissue      Discussion:  Caesarean breast tissue can become larger and more prominent with weight gain.  She did experience a 30 pound weight gain right before she felt the area.  We discussed at length the need to lose weight and this area will go back down more than likely.  Patient states that she does plan on doing the keto diet and losing weight.    Plan:  1.  Return to the office in 6 months for exam  2.  Weight loss  3.  Monthly self breast examinations  4.  Return to the office of any new breast concerns        LIZETT Castorena have spent 40 mins in face to face time with the patient and in chart review.    CC:  Self Referring  Anabel Loaiza APRN    EMR Dragon/transcription disclaimer:  Dictated using Dragon dictation

## 2023-09-08 ENCOUNTER — OFFICE VISIT (OUTPATIENT)
Dept: SURGERY | Facility: CLINIC | Age: 48
End: 2023-09-08
Payer: COMMERCIAL

## 2023-09-08 VITALS
BODY MASS INDEX: 42.14 KG/M2 | SYSTOLIC BLOOD PRESSURE: 112 MMHG | OXYGEN SATURATION: 98 % | WEIGHT: 229 LBS | HEIGHT: 62 IN | HEART RATE: 80 BPM | DIASTOLIC BLOOD PRESSURE: 70 MMHG

## 2023-09-08 DIAGNOSIS — Q83.1 ACCESSORY BREAST TISSUE OF AXILLA: Primary | ICD-10-CM

## 2024-06-05 ENCOUNTER — TELEPHONE (OUTPATIENT)
Dept: SURGERY | Facility: CLINIC | Age: 49
End: 2024-06-05
Payer: COMMERCIAL

## 2024-06-05 DIAGNOSIS — F41.1 GENERALIZED ANXIETY DISORDER: ICD-10-CM

## 2024-06-05 NOTE — TELEPHONE ENCOUNTER
Casem for pt to let her know I have cx her appointment tomorrow with Alvin PHOENIX and moved it to after her scr mammo in August that way she doesn't have to come back so soon.    I scheduled her annual and scr mammo at  on 08/02 @ 1230 and then she is scheduled to see alvin after that on 08/09 @ 120       Sent pt message via [employer]   Good Afternoon Leann   I just wanted to let you know we have canceled your appointment tomorrow with LIZETT Castorena because your screening mammogram is at the beginning of August so we are going to push your appointment with Alvin Gregorio for after that so you dont have to come back so soon.  I scheduled your mammogram and annual at [facility] on 08/02 @ 1230 and then I have you scheduled to see Alvin Gregorio on 08/09 @ 120  If you have any questions or need anything please let me know   Thanks   Bibiana DAMON

## 2024-06-06 ENCOUNTER — TELEPHONE (OUTPATIENT)
Dept: INTERNAL MEDICINE | Facility: CLINIC | Age: 49
End: 2024-06-06
Payer: COMMERCIAL

## 2024-06-06 RX ORDER — ESCITALOPRAM OXALATE 10 MG/1
10 TABLET ORAL DAILY
Qty: 90 TABLET | Refills: 0 | Status: SHIPPED | OUTPATIENT
Start: 2024-06-06

## 2024-06-06 NOTE — TELEPHONE ENCOUNTER
----- Message from Livia THOMAS sent at 6/6/2024  8:15 AM EDT -----  SHE IS DUE THIS MONTH FOR PHYSICAL AND FASTING LABS - CAN WE PLEASE GET THIS SCHEDULED?

## 2024-06-13 ENCOUNTER — TELEPHONE (OUTPATIENT)
Dept: INTERNAL MEDICINE | Facility: CLINIC | Age: 49
End: 2024-06-13
Payer: COMMERCIAL

## 2024-09-03 ENCOUNTER — TELEPHONE (OUTPATIENT)
Dept: SURGERY | Facility: CLINIC | Age: 49
End: 2024-09-03
Payer: COMMERCIAL

## 2024-09-03 DIAGNOSIS — F41.1 GENERALIZED ANXIETY DISORDER: ICD-10-CM

## 2024-09-03 RX ORDER — ESCITALOPRAM OXALATE 10 MG/1
10 TABLET ORAL DAILY
Qty: 30 TABLET | Refills: 0 | Status: SHIPPED | OUTPATIENT
Start: 2024-09-03

## 2024-09-03 NOTE — TELEPHONE ENCOUNTER
Cx pt appt with suri lord for Thursday she has not got her scr mammo yet so we need to find that when that is yanet and then rsch her appt with suri lord

## 2024-09-11 ENCOUNTER — TELEPHONE (OUTPATIENT)
Dept: INTERNAL MEDICINE | Facility: CLINIC | Age: 49
End: 2024-09-11
Payer: COMMERCIAL

## 2024-09-11 NOTE — TELEPHONE ENCOUNTER
OKAY FOR HUB TO READ:  PATIENT IS PAST DUE FOR PHYSICAL AND FASTING LAB FOR FUTURE REFILLS ON MEDICATION PLEASE SCHEDULE AND WARM TRANSFER TO SCHEDULE FASTING LABS.

## 2024-09-11 NOTE — TELEPHONE ENCOUNTER
----- Message from Rochelle LOPEZ sent at 9/11/2024  2:42 PM EDT -----  Patient was sent letter and my chart message  ----- Message -----  From: Livia Jackson MA  Sent: 9/3/2024   3:54 PM EDT  To: Sandra [address] Desk Pool    She is 3 months passed due yearly visit, please schedule fasting labs and physical, sent a 30 day supply to give enough time.

## 2024-10-03 DIAGNOSIS — F41.1 GENERALIZED ANXIETY DISORDER: ICD-10-CM

## 2024-10-03 RX ORDER — ESCITALOPRAM OXALATE 10 MG/1
10 TABLET ORAL DAILY
Qty: 30 TABLET | Refills: 0 | OUTPATIENT
Start: 2024-10-03